# Patient Record
Sex: FEMALE | Race: WHITE | NOT HISPANIC OR LATINO | Employment: FULL TIME | URBAN - NONMETROPOLITAN AREA
[De-identification: names, ages, dates, MRNs, and addresses within clinical notes are randomized per-mention and may not be internally consistent; named-entity substitution may affect disease eponyms.]

---

## 2023-03-11 ENCOUNTER — APPOINTMENT (OUTPATIENT)
Dept: GENERAL RADIOLOGY | Facility: OTHER | Age: 38
End: 2023-03-11
Attending: FAMILY MEDICINE
Payer: COMMERCIAL

## 2023-03-11 ENCOUNTER — HOSPITAL ENCOUNTER (EMERGENCY)
Facility: OTHER | Age: 38
Discharge: HOME | End: 2023-03-11
Attending: FAMILY MEDICINE | Admitting: FAMILY MEDICINE
Payer: COMMERCIAL

## 2023-03-11 VITALS
HEART RATE: 132 BPM | SYSTOLIC BLOOD PRESSURE: 166 MMHG | OXYGEN SATURATION: 96 % | HEIGHT: 62 IN | RESPIRATION RATE: 16 BRPM | WEIGHT: 205 LBS | DIASTOLIC BLOOD PRESSURE: 104 MMHG | BODY MASS INDEX: 37.73 KG/M2 | TEMPERATURE: 99.3 F
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
## 2023-03-11 DIAGNOSIS — M54.50 ACUTE RIGHT-SIDED LOW BACK PAIN WITHOUT SCIATICA: ICD-10-CM

## 2023-03-11 PROCEDURE — 99283 EMERGENCY DEPT VISIT LOW MDM: CPT | Performed by: FAMILY MEDICINE

## 2023-03-11 PROCEDURE — 99284 EMERGENCY DEPT VISIT MOD MDM: CPT

## 2023-03-11 PROCEDURE — 72170 X-RAY EXAM OF PELVIS: CPT

## 2023-03-11 PROCEDURE — 72100 X-RAY EXAM L-S SPINE 2/3 VWS: CPT

## 2023-03-11 ASSESSMENT — ACTIVITIES OF DAILY LIVING (ADL): ADLS_ACUITY_SCORE: 35

## 2023-03-11 NOTE — ED PROVIDER NOTES
"  History     Chief Complaint   Patient presents with     Fall     The history is provided by the patient and medical records.     Carlie Michel is a 37 year old female who fell yesterday at home. She missed a step and fell down, landing on the right buttocks. She can walk but has quite a bit of L spine and pelvic pain when she sits down or lays down. No other injury.     No daily meds.     Allergies:  No Known Allergies    Problem List:    There are no problems to display for this patient.       Past Medical History:    Past Medical History:   Diagnosis Date     History of other genital system and obstetric disorders        Past Surgical History:    Past Surgical History:   Procedure Laterality Date     OTHER SURGICAL HISTORY      [id number],OTHER,with colposcopy.  Normal 11/05, 07/06       Family History:    Family History   Problem Relation Age of Onset     Hypertension Father         Hypertension     Breast Cancer Mother 50        Cancer-breast       Social History:  Marital Status:   [2]  Social History     Tobacco Use     Smoking status: Never     Smokeless tobacco: Never   Substance Use Topics     Alcohol use: No     Drug use: Unknown     Types: Other     Comment: Drug use: No        Medications:    No current outpatient medications on file.        Review of Systems   Musculoskeletal:        Right sided pelvic pain, L spine pain   All other systems reviewed and are negative.      Physical Exam   BP: (!) 166/104  Pulse: (!) 132  Temp: 99.3  F (37.4  C)  Resp: 16  Height: 157.5 cm (5' 2\")  Weight: 93 kg (205 lb)  SpO2: 96 %      Physical Exam  Vitals and nursing note reviewed.   Constitutional:       General: She is not in acute distress.     Appearance: Normal appearance. She is not ill-appearing, toxic-appearing or diaphoretic.   Cardiovascular:      Rate and Rhythm: Normal rate.      Pulses: Normal pulses.   Pulmonary:      Effort: Pulmonary effort is normal. No respiratory distress.   Musculoskeletal: "      Comments: She has tenderness of the right side low back and the right buttock.   Skin:     General: Skin is warm and dry.      Comments: She has bruising over the right buttocks spreading up towards the low back   Neurological:      Mental Status: She is alert.         Results for orders placed or performed during the hospital encounter of 03/11/23 (from the past 24 hour(s))   XR Pelvis 1/2 Views    Narrative    XR PELVIS 1/2 VIEWS    HISTORY: 37 years Female fall with right sided pelvic pain- she can  walk but has pain with sitting, concern for ischial tuberosity  fracture    COMPARISON: None    TECHNIQUE: Single view pelvis    FINDINGS: The sacroiliac joints and symphysis are congruent. Both hip  joints are congruent. There is no evidence of pelvic fracture.      Impression    IMPRESSION: Negative study.    WILFRIDO LAM MD         SYSTEM ID:  [id number]   XR Lumbar Spine 2/3 Views    Narrative    XR LUMBAR SPINE 2/3 VIEWS    HISTORY: 37 years Female fall with L spine pain, she can walk but has  pain with sitting down, concern for L spine compression fxr    COMPARISON: None    TECHNIQUE: Previous lumbar spine    FINDINGS: Vertebral body height is maintained throughout. There is no  evidence of fracture or subluxation.    There is no significant degenerative disc disease.    The sacral iliac joints are congruent.      Impression    IMPRESSION: No evidence of fracture or subluxation of the lumbar  spine.    WILFRIDO LAM MD         SYSTEM ID:  [id number]       Medications - No data to display    Assessments & Plan (with Medical Decision Making)  Carlie Michel is a 37 year old female who fell yesterday at home. She missed a step and fell down, landing on the right buttocks. She can walk but has quite a bit of L spine and pelvic pain when she sits down or lays down. No other injury.  No daily meds. VS in the ED on room air BP (!) 166/104   Pulse (!) 132   Temp 99.3  F (37.4  C) (Tympanic)   Resp 16   " Ht 1.575 m (5' 2\")   Wt 93 kg (205 lb)   LMP 03/03/2023 (Exact Date)   SpO2 96%   BMI 37.49 kg/m    Exam shows bruising of the right buttock and up towards the low back. She has tenderness of the buttock and midline L spine.  She took some Tylenol and does not want any more pain medicine.  Xrays of the pelvis negative3 and L spine negative. I did tell her that she will be very sore tomorrow and Monday.      I have reviewed the nursing notes.    I have reviewed the findings, diagnosis, plan and need for follow up with the patient.  Medical Decision Making  The patient's presentation was of low complexity (an acute and uncomplicated illness or injury).    The patient's evaluation involved:  an assessment requiring an independent historian (see separate area of note for details)  ordering and/or review of 2 test(s) in this encounter (see separate area of note for details)    The patient's management necessitated only low risk treatment.      Final diagnoses:   Fall in home, initial encounter   Acute right-sided low back pain without sciatica       3/11/2023   [facility], Jun Jones MD  03/11/23 1852    "

## 2023-03-11 NOTE — ED TRIAGE NOTES
Pt fell down her stairs last night and today has bruising on right hip, buttocks. Pt does say that she has some numbness in bruised area. Pt states more painful when sitting.      Triage Assessment     Row Name 03/11/23 [id number]       Triage Assessment (Adult)    Airway WDL WDL       Respiratory WDL    Respiratory WDL WDL       Skin Circulation/Temperature WDL    Skin Circulation/Temperature WDL WDL       Cardiac WDL    Cardiac WDL WDL       Peripheral/Neurovascular WDL    Peripheral Neurovascular WDL WDL       Cognitive/Neuro/Behavioral WDL    Cognitive/Neuro/Behavioral WDL WDL

## 2023-03-12 NOTE — DISCHARGE INSTRUCTIONS
Carlie    Your xrays are negative. I think you will get better with time but you will be very sore tomorrow and Monday.     Thank you for choosing our Emergency Department for your care.     You may receive a phone call or letter for a survey about your care in our ED.  Please complete this as this is how we improve care for our patients.     If you have any questions after leaving the ED you can call or text me on my cell phone at [phone] and I will get back to you at some point. This does not mean that I am on call and if you are not doing well please return to the ED.     Sincerely,    Dr Jon Coppola M.D.

## 2023-05-06 ENCOUNTER — HEALTH MAINTENANCE LETTER (OUTPATIENT)
Age: 38
End: 2023-05-06

## 2023-07-29 ENCOUNTER — HEALTH MAINTENANCE LETTER (OUTPATIENT)
Age: 38
End: 2023-07-29

## 2024-02-24 ENCOUNTER — HEALTH MAINTENANCE LETTER (OUTPATIENT)
Age: 39
End: 2024-02-24

## 2024-09-21 ENCOUNTER — HEALTH MAINTENANCE LETTER (OUTPATIENT)
Age: 39
End: 2024-09-21